# Patient Record
Sex: MALE | Race: WHITE | Employment: FULL TIME | URBAN - METROPOLITAN AREA
[De-identification: names, ages, dates, MRNs, and addresses within clinical notes are randomized per-mention and may not be internally consistent; named-entity substitution may affect disease eponyms.]

---

## 2018-08-03 ENCOUNTER — HOSPITAL ENCOUNTER (EMERGENCY)
Age: 28
Discharge: HOME | End: 2018-08-03
Payer: COMMERCIAL

## 2018-08-03 VITALS
TEMPERATURE: 98.5 F | WEIGHT: 180 LBS | SYSTOLIC BLOOD PRESSURE: 120 MMHG | RESPIRATION RATE: 16 BRPM | OXYGEN SATURATION: 100 % | HEIGHT: 69 IN | HEART RATE: 72 BPM | BODY MASS INDEX: 26.66 KG/M2 | DIASTOLIC BLOOD PRESSURE: 84 MMHG

## 2018-08-03 DIAGNOSIS — B35.4 TINEA CORPORIS: Primary | ICD-10-CM

## 2018-08-03 PROCEDURE — 99282 EMERGENCY DEPT VISIT SF MDM: CPT

## 2018-08-03 ASSESSMENT — ENCOUNTER SYMPTOMS
EYES NEGATIVE: 1
RESPIRATORY NEGATIVE: 1
GASTROINTESTINAL NEGATIVE: 1

## 2018-08-03 NOTE — ED PROVIDER NOTES
**[facility]  eMERGENCY dEPARTMENT eNCOUnter      Pt Name: Chiki Jimenez  MRN: [id number]  [place] 1990  Date of evaluation: 8/3/2018  Provider: ELISEO Nicolas CNP      Chief Complaint:    Chief Complaint   Patient presents with    Rash     Rash on feet, arms, stomach onset 8mths ago, became worse in last 2wks       Nursing Notes, Past Medical Hx, Past Surgical Hx, Social Hx, Allergies, and Family Hx were all reviewed and agreed with or any disagreements were addressed in the HPI.    HPI:  (Location, Duration, Timing, Severity, Quality, Assoc Sx, Context, Modifying factors)  This is a  24 y.o. male presents to the emergency Department with complaints of a rash. Patient reports he noticed a rash on his feet 8 months ago. Patient reports this subsided on its own, however the rash has now returned to both feet. The rash has also spread to bilateral upper extremities as well as his abdomen. Patient denies any itching or pain from the rash. He denies any fevers or chills. Patient states he is otherwise well. Past Medical/Surgical History:      Diagnosis Date    Anger reaction     Gastric ulcer, unspecified as acute or chronic, without mention of hemorrhage or perforation     GERD (gastroesophageal reflux disease)      History reviewed. No pertinent surgical history. Medications:  Previous Medications    No medications on file         Review of Systems:  Review of Systems   Constitutional: Negative for chills and fever. HENT: Negative. Eyes: Negative. Respiratory: Negative. Cardiovascular: Negative. Gastrointestinal: Negative. Genitourinary: Negative. Musculoskeletal: Negative. Skin: Positive for rash. Neurological: Negative. Hematological: Negative. Psychiatric/Behavioral: Negative. Positives and Pertinent negatives as per HPI.   Except as noted above in the ROS, problem specific ROS was completed and is negative. Physical Exam:  Physical Exam   Constitutional: He is oriented to person, place, and time. He appears well-developed and well-nourished. HENT:   Head: Normocephalic and atraumatic. Right Ear: External ear normal.   Left Ear: External ear normal.   Nose: Nose normal.   Eyes: Conjunctivae are normal.   Neck: Normal range of motion. Cardiovascular: Normal rate, regular rhythm and normal heart sounds. Pulmonary/Chest: Effort normal and breath sounds normal.   Abdominal: Soft. Musculoskeletal: Normal range of motion. Neurological: He is alert and oriented to person, place, and time. Skin: Skin is warm, dry and intact. Rash noted. Rash is macular (macular rash to bilateral feet and upper extremities) and papular (fine papular rash to waist line). Psychiatric: He has a normal mood and affect. His behavior is normal.   Nursing note and vitals reviewed. MEDICAL DECISION MAKING    Vitals:    Vitals:    08/03/18 1421   BP: 132/73   Pulse: 78   Resp: 16   Temp: 98.5 °F (36.9 °C)   TempSrc: Oral   SpO2: 100%   Weight: 180 lb (81.6 kg)   Height: 5' 9\" (1.753 m)       LABS: Labs Reviewed - No data to display     Remainder of labs reviewed and were negative at this time or not returned at the time of this note. RADIOLOGY:   Non-plain film images such as CT, Ultrasound and MRI are read by the radiologist. ELISEO So CNP have directly visualized the radiologic plain film image(s) with the below findings:        Interpretation per the Radiologist below, if available at the time of this note:    No orders to display        No results found. MEDICAL DECISION MAKING / ED COURSE:      PROCEDURES:   Procedures    None    Patient was given:  Medications - No data to display    Patient presents to the emergency Department with complaints of a rash to his bilateral feet, bilateral upper extremities, and abdomen.   Examination revealed macular rash to bilateral feet and bilateral upper extremities. This appeared fungal in nature. I prescribed the patient terbinafine. Patient is to follow-up with primary care physician and was provided with a referral.  I discussed treatment plan with the patient, patient was agreeable at this time denied any questions. Patient is to return to the emergency department if any worsening of symptoms. The patient tolerated their visit well. I saw the patient independently with physician available for consultation as needed. The patient and / or the family were informed of the results of any tests, a time was given to answer questions, a plan was proposed and they agreed with plan. CLINICAL IMPRESSION:  1. Tinea corporis        DISPOSITION Decision To Discharge 08/03/2018 02:38:57 PM      PATIENT REFERRED TO:  [facility] ED  [address] [facility]  [phone]  Go to   If symptoms worsen, As needed      DISCHARGE MEDICATIONS:  New Prescriptions    TERBINAFINE (ATHLETES FOOT) 1 % CREAM    Apply topically 2 times daily. DISCONTINUED MEDICATIONS:  Discontinued Medications    CYCLOBENZAPRINE (FLEXERIL) 5 MG TABLET    Take 1 tablet by mouth every 8 hours as needed for Muscle spasms. DICYCLOMINE (BENTYL) 10 MG CAPSULE    Take 1 capsule by mouth 2 times daily. LORATADINE (CLARITIN) 10 MG TABLET    Take 1 tablet by mouth daily as needed. TIZANIDINE (ZANAFLEX) 4 MG CAPSULE    Take 1 capsule by mouth 3 times daily as needed for Muscle spasms.               (Please note the MDM and HPI sections of this note were completed with a voice recognition program.  Efforts were made to edit the dictations but occasionally words are mis-transcribed.)    Electronically signed, ELISEO Fair CNP,        ELISEO Fair CNP  08/03/18 2134